# Patient Record
Sex: FEMALE | Race: WHITE | NOT HISPANIC OR LATINO | Employment: OTHER | URBAN - METROPOLITAN AREA
[De-identification: names, ages, dates, MRNs, and addresses within clinical notes are randomized per-mention and may not be internally consistent; named-entity substitution may affect disease eponyms.]

---

## 2018-02-05 ENCOUNTER — HOSPITAL ENCOUNTER (OUTPATIENT)
Dept: MAMMOGRAPHY | Facility: CLINIC | Age: 73
Discharge: HOME | End: 2018-02-05
Attending: FAMILY MEDICINE | Admitting: FAMILY MEDICINE
Payer: MEDICARE

## 2018-02-05 DIAGNOSIS — Z12.31 VISIT FOR SCREENING MAMMOGRAM: ICD-10-CM

## 2018-02-05 PROCEDURE — 77067 SCR MAMMO BI INCL CAD: CPT

## 2018-05-10 ENCOUNTER — THERAPY VISIT (OUTPATIENT)
Dept: PHYSICAL THERAPY | Facility: CLINIC | Age: 73
End: 2018-05-10
Payer: COMMERCIAL

## 2018-05-10 DIAGNOSIS — M54.50 ACUTE MIDLINE LOW BACK PAIN WITHOUT SCIATICA: Primary | ICD-10-CM

## 2018-05-10 PROCEDURE — 97110 THERAPEUTIC EXERCISES: CPT | Mod: GP | Performed by: PHYSICAL THERAPIST

## 2018-05-10 PROCEDURE — 97161 PT EVAL LOW COMPLEX 20 MIN: CPT | Mod: GP | Performed by: PHYSICAL THERAPIST

## 2018-05-10 NOTE — LETTER
[facility] ATHLETIC [facility] PHYSICAL THERAPY  [address] [place] [address]  [facility] [id number] [phone]    May 11, 2018    Re: Ml Aguirre   :   1945  MRN:  [id number]   REFERRING PHYSICIAN:   Liban Robledo    [facility] ATHLETIC [facility] PHYSICAL [facility]    Date of Initial Evaluation:  5/10/2018  Visits:  Rxs Used: 1  Reason for Referral:  Acute midline low back pain without sciatica    EVALUATION SUMMARY    [facility] Initial Evaluation  Subjective:  Ml Aguirre is a 73 year old female with a lumbar condition.  Condition occurred with:  Insidious onset.  Condition occurred: for unknown reasons.  This is a new condition  Onset of central LBP 18 for unknown reasons. She does recall that one week earlier she tripped and fell landing on her hands and knees but did not have any pain. Also she was on vacation the week before and was doing a lot more sitting than usual. Pain with bending, rise from sit, and sitting in soft surface. She had to stop sitting in her recliner d/t pain. Sitting in a firmer chair now. Her sx's are starting to improve. Pt states she has had episodes of LBP with bending activities--painful for several days then resolves. Hx of B LENY in  and .  Patient reports pain:  Central lumbar spine.  Pain is described as aching and is intermittent and reported as 2/10.   Worse during: varies.  Symptoms are exacerbated by bending, sitting and other (rise from sit) and relieved by rest.  Since onset symptoms are gradually improving.  Special tests:  X-ray (grade I retrolisthesis of L3 on L4 and mild degenerative changes).  General health as reported by patient is good.  Barriers include:  None as reported by the patient.  Red flags:  None as reported by the patient.  Pertinent medical history includes:  Cancer.  Other surgeries include:  Cancer surgery and orthopedic surgery (Hips).  Current occupation is  Retired.  Oswestry Score: 8 %      Objective:  System  Lumbar/SI Evaluation  ROM:    AROM Lumbar:   Flexion:          Hands to ankles  Ext:                    75%   Side Bend:        Left:  WNL    Right:  WNL  Rotation:           Left:     Right:   Side Glide:        Left:     Right:   Lumbar Myotomes:  not assessed  Lumbar DTR's:  not assessed  Lumbar Dermtomes:  not assessed  Neural Tension/Mobility:    Left side:SLR; SLR w/DF or Slump  negative.   Right side:   SLR w/DF; Slump or SLR  negative.   Martin Lumbar Evaluation  Posture:  Sitting: fair  Standing: good  Lordosis: WNL  Lateral Shift: no  Movement Loss:  Flexion (Flex): nil  Extension (EXT): min  Side Glide R (SG R): nil  Side Glide L (SG L): nil  Test Movements:  EIS: During: no effect    Repeat EIS: During: no effect  After: no effect  Mechanical Response: IncROM  URMILA: During: no effect    Repeat URMILA: During: no effect  After: no effect  Mechanical Response: no effect  EIL: During: no effect    Repeat EIL: During: no effect  After: no effect  Mechanical Response: IncROM  Conclusion: derangement  Principle of Treatment:  Extension: REIL x 10 5x/day    ROS  Assessment/Plan:    Patient is a 73 year old female with lumbar complaints that are improving.  Did not elicit her LBP today. She has mild loss of lumbar extension motion that improved with REIL. We worked on neutral spine with sitting and bending, instructed/practiced with lumbar roll. Pt may consider purchasing a roll.    Patient has the following significant findings with corresponding treatment plan.                Diagnosis 1:  Central LBP  Pain -  directional preference exercise  Decreased ROM/flexibility - manual therapy and therapeutic exercise  Decreased function - therapeutic activities  Impaired posture - neuro re-education    Therapy Evaluation Codes:   1) History comprised of:   Personal factors that impact the plan of care:      None.    Comorbidity factors that impact the plan of care  are:      None.     Medications impacting care: None.  2) Examination of Body Systems comprised of:   Body structures and functions that impact the plan of care:      Lumbar spine.   Activity limitations that impact the plan of care are:      Lifting, Sitting and sit to stand.  3) Clinical presentation characteristics are:   Stable/Uncomplicated.    4) Decision-Making    Low complexity using standardized patient assessment instrument and/or   measureable assessment of functional outcome.  Cumulative Therapy Evaluation is: Low complexity.    Previous and current functional limitations:  (See Goal Flow Sheet for this information)    Short term and Long term goals: (See Goal Flow Sheet for this information)     Communication ability:  Patient appears to be able to clearly communicate and understand verbal and written communication and follow directions correctly.  Treatment Explanation - The following has been discussed with the patient:   RX ordered/plan of care  Anticipated outcomes  Possible risks and side effects  This patient would benefit from PT intervention to resume normal activities.   Rehab potential is good.    Frequency:  1 X week, once daily  Duration:  for 2 weeks tapering to 2 X a month over 6 weeks  Discharge Plan:  Achieve all LTG.  Independent in home treatment program.  Reach maximal therapeutic benefit.    Thank you for your referral.    INQUIRIES  Therapist: Marzena Archuleta, PT   [facility] PHYSICAL THERAPY  [address] [place] [id number]  Phone: [phone]  Fax: [phone]

## 2018-05-10 NOTE — PROGRESS NOTES
[facility] Initial Evaluation  Subjective:  Patient is a 73 year old female presenting with rehab back hpi. The history is provided by the patient.   Ml Aguirre is a 73 year old female with a lumbar condition.  Condition occurred with:  Insidious onset.  Condition occurred: for unknown reasons.  This is a new condition  Onset of central LBP 4-1-18 for unknown reasons. She does recall that one week earlier she tripped and fell landing on her hands and knees but did not have any pain. Also she was on vacation the week before and was doing a lot more sitting than usual. Pain with bending, rise from sit, and sitting in soft surface. She had to stop sitting in her recliner d/t pain. Sitting in a firmer chair now. Her sx's are starting to improve. Pt states she has had episodes of LBP with bending activities--painful for several days then resolves. Hx of B LENY in 2012 and 2013.    Patient reports pain:  Central lumbar spine.    Pain is described as aching and is intermittent and reported as 2/10.   Worse during: varies.  Symptoms are exacerbated by bending, sitting and other (rise from sit) and relieved by rest.  Since onset symptoms are gradually improving.  Special tests:  X-ray (grade I retrolisthesis of L3 on L4 and mild degenerative changes).      General health as reported by patient is good.                  Barriers include:  None as reported by the patient.    Red flags:  None as reported by the patient.                        Objective:  System         Lumbar/SI Evaluation  ROM:    AROM Lumbar:   Flexion:          Hands to ankles  Ext:                    75%   Side Bend:        Left:  WNL    Right:  WNL  Rotation:           Left:     Right:   Side Glide:        Left:     Right:           Lumbar Myotomes:  not assessed            Lumbar DTR's:  not assessed        Lumbar Dermtomes:  not assessed                Neural Tension/Mobility:      Left side:SLR; SLR w/DF or Slump  negative.      Right side:   SLR w/DF; Slump or SLR  negative.                                                          Martin Lumbar Evaluation    Posture:  Sitting: fair  Standing: good  Lordosis: WNL  Lateral Shift: no      Movement Loss:  Flexion (Flex): nil  Extension (EXT): min  Side Glide R (SG R): nil  Side Glide L (SG L): nil  Test Movements:    EIS: During: no effect    Repeat EIS: During: no effect  After: no effect  Mechanical Response: IncROM  URMILA: During: no effect    Repeat URMILA: During: no effect  After: no effect  Mechanical Response: no effect  EIL: During: no effect    Repeat EIL: During: no effect  After: no effect  Mechanical Response: IncROM        Conclusion: derangement  Principle of Treatment:      Extension: REIL x 10 5x/day                                           ROS    Assessment/Plan:    Patient is a 73 year old female with lumbar complaints that are improving.  Did not elicit her LBP today. She has mild loss of lumbar extension motion that improved with REIL. We worked on neutral spine with sitting and bending, instructed/practiced with lumbar roll. Pt may consider purchasing a roll.    Patient has the following significant findings with corresponding treatment plan.                Diagnosis 1:  Central LBP  Pain -  directional preference exercise  Decreased ROM/flexibility - manual therapy and therapeutic exercise  Decreased function - therapeutic activities  Impaired posture - neuro re-education    Therapy Evaluation Codes:   1) History comprised of:   Personal factors that impact the plan of care:      None.    Comorbidity factors that impact the plan of care are:      None.     Medications impacting care: None.  2) Examination of Body Systems comprised of:   Body structures and functions that impact the plan of care:      Lumbar spine.   Activity limitations that impact the plan of care are:      Lifting, Sitting and sit to stand.  3) Clinical presentation characteristics  are:   Stable/Uncomplicated.  4) Decision-Making    Low complexity using standardized patient assessment instrument and/or measureable assessment of functional outcome.  Cumulative Therapy Evaluation is: Low complexity.    Previous and current functional limitations:  (See Goal Flow Sheet for this information)    Short term and Long term goals: (See Goal Flow Sheet for this information)     Communication ability:  Patient appears to be able to clearly communicate and understand verbal and written communication and follow directions correctly.  Treatment Explanation - The following has been discussed with the patient:   RX ordered/plan of care  Anticipated outcomes  Possible risks and side effects  This patient would benefit from PT intervention to resume normal activities.   Rehab potential is good.    Frequency:  1 X week, once daily  Duration:  for 2 weeks tapering to 2 X a month over 6 weeks  Discharge Plan:  Achieve all LTG.  Independent in home treatment program.  Reach maximal therapeutic benefit.    Please refer to the daily flowsheet for treatment today, total treatment time and time spent performing 1:1 timed codes.

## 2018-05-10 NOTE — MR AVS SNAPSHOT
After Visit Summary   5/10/2018    Ml Aguirre    MRN: [id number]           Patient Information     Date Of Birth          1945        Visit Information        Provider Department      5/10/2018 11:30 AM Marzena Archuleta PT [facility] Athletic [facility] Physical Therapy        Today's Diagnoses     Acute midline low back pain without sciatica    -  1       Follow-ups after your visit        Your next 10 appointments already scheduled     May 17, 2018 11:30 AM CDT   NAOMI Spine with Marzena Archuleta PT   [facility] Athletic [facility] Physical Therapy ([facility]  )    [address] [place] [address]  [facility] [id number] [phone]            May 21, 2018 11:30 AM CDT   NAOMI Spine with Meg Montoya PT   [facility] Athletic [facility] Physical Therapy ([facility]  )    [address] [place] [address]  [facility] [id number] [phone]              Who to contact     If you have questions or need follow up information about today's clinic visit or your schedule please contact [facility] ATHLETIC [facility] PHYSICAL THERAPY directly at [phone].  Normal or non-critical lab and imaging results will be communicated to you by MyChart, letter or phone within 4 business days after the clinic has received the results. If you do not hear from us within 7 days, please contact the clinic through Spiralcathart or phone. If you have a critical or abnormal lab result, we will notify you by phone as soon as possible.  Submit refill requests through Phreesia or call your pharmacy and they will forward the refill request to us. Please allow 3 business days for your refill to be completed.          Additional Information About Your Visit        MyChart Information     Phreesia lets you send messages to your doctor, view your test results, renew your prescriptions, schedule appointments and more. To sign up, go to [email or web address]  [email or web address] Click on \"Log in\" on the left side of the screen, which will take you to the Welcome page. Then click on \"Sign up Now\" on the right side of the page.     You will be asked to enter the access code listed below, as well as some personal information. Please follow the directions to create your username and password.     Your access code is: [email or web address]  Expires: 2018 12:31 PM     Your access code will  in 90 days. If you need help or a new code, please call your [place] clinic or [phone].        Care EveryWhere ID     This is your Care EveryWhere ID. This could be used by other organizations to access your [place] medical records  [id number]         Blood Pressure from Last 3 Encounters:   13 128/77   12 115/71    Weight from Last 3 Encounters:   13 62.1 kg (137 lb)   12 59.3 kg (130 lb 12.8 oz)              We Performed the Following     HC PT EVAL, LOW COMPLEXITY     NAOMI INITIAL EVAL REPORT     THERAPEUTIC EXERCISES        Primary Care Provider Office Phone # Fax #    Liban Robledo [phone]       [facility] [address]  [facility] [address]        Equal Access to Services     FLORENCIA RANGEL : Hadii aad ku hadasho Soomaali, waaxda luqadaha, qaybta kaalmada adeegyada, waxay raynain hayaan kamilah araiza . So [facility] [phone].    ATENCIÓN: Si habla español, tiene a brooks disposición servicios gratuitos de asistencia lingüística. Llame al [phone].    We comply with applicable federal civil rights laws and [place] laws. We do not discriminate on the basis of race, color, national origin, age, disability, sex, sexual orientation, or gender identity.            Thank you!     Thank you for choosing [facility] PHYSICAL THERAPY  for your care. Our goal is always to provide you with excellent care. Hearing back from our patients is one way we can continue to improve our services. Please take a " few minutes to complete the written survey that you may receive in the mail after your visit with us. Thank you!             Your Updated Medication List - Protect others around you: Learn how to safely use, store and throw away your medicines at www.disposemymeds.org.          This list is accurate as of 5/10/18 12:31 PM.  Always use your most recent med list.                   Brand Name Dispense Instructions for use Diagnosis    ACETAMINOPHEN 8 HOUR 650 MG 8 hour tablet   Generic drug:  acetaminophen     250 tablet    Take 650 mg by mouth every 6 hours as needed.    Arthritis pain, hip, S/P total hip arthroplasty       calcium + D 600-200 MG-UNIT Tabs   Generic drug:  calcium carbonate-vitamin D      Take 1 tablet by mouth 2 times daily.        HYDROmorphone 2 MG tablet    DILAUDID    120 tablet    Take 1-2 tablets by mouth every 3 hours as needed.    Arthritis pain, hip       Multi-vitamin Tabs tablet   Generic drug:  multivitamin, therapeutic with minerals      Take 1 tablet by mouth daily.        warfarin 2.5 MG tablet    COUMADIN    40 tablet    Take 1 tablet by mouth daily.    Arthritis pain, hip, S/P total hip arthroplasty

## 2018-05-11 NOTE — PROGRESS NOTES
[facility] Initial Evaluation  Subjective:  Patient is a 73 year old female presenting with rehab left ankle/foot hpi.                                      Pertinent medical history includes:  Cancer.    Other surgeries include:  Cancer surgery and orthopedic surgery (Hips).    Current occupation is Retired.                  Oswestry Score: 8 %                 Objective:  System    Physical Exam    General     ROS    Assessment/Plan:

## 2018-05-17 ENCOUNTER — THERAPY VISIT (OUTPATIENT)
Dept: PHYSICAL THERAPY | Facility: CLINIC | Age: 73
End: 2018-05-17
Payer: COMMERCIAL

## 2018-05-17 DIAGNOSIS — M54.50 ACUTE MIDLINE LOW BACK PAIN WITHOUT SCIATICA: ICD-10-CM

## 2018-05-17 PROCEDURE — 97110 THERAPEUTIC EXERCISES: CPT | Mod: GP | Performed by: PHYSICAL THERAPIST

## 2018-05-17 PROCEDURE — 97530 THERAPEUTIC ACTIVITIES: CPT | Mod: GP | Performed by: PHYSICAL THERAPIST

## 2018-05-21 ENCOUNTER — THERAPY VISIT (OUTPATIENT)
Dept: PHYSICAL THERAPY | Facility: CLINIC | Age: 73
End: 2018-05-21
Payer: COMMERCIAL

## 2018-05-21 DIAGNOSIS — M54.50 ACUTE MIDLINE LOW BACK PAIN WITHOUT SCIATICA: ICD-10-CM

## 2018-05-21 PROCEDURE — 97112 NEUROMUSCULAR REEDUCATION: CPT | Mod: GP | Performed by: PHYSICAL THERAPIST

## 2018-05-21 PROCEDURE — 97110 THERAPEUTIC EXERCISES: CPT | Mod: GP | Performed by: PHYSICAL THERAPIST

## 2018-05-21 NOTE — MR AVS SNAPSHOT
"              After Visit Summary   2018    Ml Aguirre    MRN: [id number]           Patient Information     Date Of Birth          1945        Visit Information        Provider Department      2018 11:30 AM Meg Montoya PT [facility] Athletic [facility] Physical Therapy        Today's Diagnoses     Acute midline low back pain without sciatica           Follow-ups after your visit        Who to contact     If you have questions or need follow up information about today's clinic visit or your schedule please contact [facility] ATHLETIC [facility] PHYSICAL [facility] directly at [phone].  Normal or non-critical lab and imaging results will be communicated to you by MyChart, letter or phone within 4 business days after the clinic has received the results. If you do not hear from us within 7 days, please contact the clinic through Rodatihart or phone. If you have a critical or abnormal lab result, we will notify you by phone as soon as possible.  Submit refill requests through Topple Track or call your pharmacy and they will forward the refill request to us. Please allow 3 business days for your refill to be completed.          Additional Information About Your Visit        MyChart Information     Topple Track lets you send messages to your doctor, view your test results, renew your prescriptions, schedule appointments and more. To sign up, go to [email or web address] . Click on \"Log in\" on the left side of the screen, which will take you to the Welcome page. Then click on \"Sign up Now\" on the right side of the page.     You will be asked to enter the access code listed below, as well as some personal information. Please follow the directions to create your username and password.     Your access code is: [email or web address]  Expires: 2018 12:31 PM     Your access code will  in 90 days. If you need help or a new code, please call your [place] clinic or [phone].   "      Care EveryWhere ID     This is your Care EveryWhere ID. This could be used by other organizations to access your [place] medical records  [id number]         Blood Pressure from Last 3 Encounters:   03/06/13 128/77   08/11/12 115/71    Weight from Last 3 Encounters:   03/04/13 62.1 kg (137 lb)   08/08/12 59.3 kg (130 lb 12.8 oz)              We Performed the Following     Neuromuscular Re-Education     Therapeutic Exercises        Primary Care Provider Office Phone # Fax #    Liban Robledo [phone]       [facility] [address]  [place] [address]        Equal Access to Services     FLORENCIA RANGEL : Hadii arlene gannon hadmoiseso Soqueta, waaxda luqadaha, qaybta kaalmada adeluciayada, theodore araiza . So [facility] [phone].    ATENCIÓN: Si habla español, tiene a brooks disposición servicios gratuitos de asistencia lingüística. Llame al [phone].    We comply with applicable federal civil rights laws and [place] laws. We do not discriminate on the basis of race, color, national origin, age, disability, sex, sexual orientation, or gender identity.            Thank you!     Thank you for choosing [facility] PHYSICAL THERAPY  for your care. Our goal is always to provide you with excellent care. Hearing back from our patients is one way we can continue to improve our services. Please take a few minutes to complete the written survey that you may receive in the mail after your visit with us. Thank you!             Your Updated Medication List - Protect others around you: Learn how to safely use, store and throw away your medicines at www.disposemymeds.org.          This list is accurate as of 5/21/18  4:15 PM.  Always use your most recent med list.                   Brand Name Dispense Instructions for use Diagnosis    ACETAMINOPHEN 8 HOUR 650 MG 8 hour tablet   Generic drug:  acetaminophen     250 tablet    Take 650 mg  by mouth every 6 hours as needed.    Arthritis pain, hip, S/P total hip arthroplasty       calcium + D 600-200 MG-UNIT Tabs   Generic drug:  calcium carbonate-vitamin D      Take 1 tablet by mouth 2 times daily.        HYDROmorphone 2 MG tablet    DILAUDID    120 tablet    Take 1-2 tablets by mouth every 3 hours as needed.    Arthritis pain, hip       Multi-vitamin Tabs tablet   Generic drug:  multivitamin, therapeutic with minerals      Take 1 tablet by mouth daily.        warfarin 2.5 MG tablet    COUMADIN    40 tablet    Take 1 tablet by mouth daily.    Arthritis pain, hip, S/P total hip arthroplasty

## 2019-02-05 PROBLEM — M54.50 ACUTE MIDLINE LOW BACK PAIN WITHOUT SCIATICA: Status: RESOLVED | Noted: 2018-05-10 | Resolved: 2019-02-05

## 2019-02-05 NOTE — PROGRESS NOTES
DISCHARGE REPORT    Progress reporting period is from 5-10-18 to 5-21-18.       SUBJECTIVE  Subjective changes noted by patient: Pt reports continued reduction of low back pain. Aware of stiffness in the AM upon awakening but lessens as the day progresses.    Current pain level is 0-1/10    Previous pain level was  0-210    Changes in function:  Yes (See Goal flowsheet attached for changes in current functional level)  Adverse reaction to treatment or activity: None    OBJECTIVE  Objective: FIS: mild loss d/t hamstring tightness. EIS: no loss. EIL: mild loss     ASSESSMENT/PLAN  Updated problem list and treatment plan: low back pain  STG/LTGs have been met or progress has been made towards goals:  Yes (See Goal flow sheet completed today.)  Assessment of Progress: Patient is meeting short term goals and is progressing towards long term goals.  Self Management Plans:  Patient is independent in a home treatment program.  Ml continues to require the following intervention to meet STG and LTG's:  PT intervention is no longer required to meet STG/LTG.    Recommendations:  This patient is ready to be discharged from therapy and continue their home treatment program.    Please refer to the daily flowsheet for treatment today, total treatment time and time spent performing 1:1 timed codes.

## 2019-07-02 ENCOUNTER — HOSPITAL ENCOUNTER (OUTPATIENT)
Dept: MAMMOGRAPHY | Facility: CLINIC | Age: 74
Discharge: HOME | End: 2019-07-02
Attending: FAMILY MEDICINE | Admitting: FAMILY MEDICINE
Payer: COMMERCIAL

## 2019-07-02 DIAGNOSIS — Z12.31 VISIT FOR SCREENING MAMMOGRAM: ICD-10-CM

## 2019-07-02 PROCEDURE — 77063 BREAST TOMOSYNTHESIS BI: CPT

## 2020-09-15 ENCOUNTER — HOSPITAL ENCOUNTER (OUTPATIENT)
Dept: MAMMOGRAPHY | Facility: CLINIC | Age: 75
Discharge: HOME | End: 2020-09-15
Attending: FAMILY MEDICINE | Admitting: FAMILY MEDICINE
Payer: COMMERCIAL

## 2020-09-15 DIAGNOSIS — Z12.31 VISIT FOR SCREENING MAMMOGRAM: ICD-10-CM

## 2020-09-15 PROCEDURE — 77067 SCR MAMMO BI INCL CAD: CPT

## 2021-09-17 ENCOUNTER — HOSPITAL ENCOUNTER (OUTPATIENT)
Dept: MAMMOGRAPHY | Facility: CLINIC | Age: 76
Discharge: HOME | End: 2021-09-17
Attending: FAMILY MEDICINE | Admitting: FAMILY MEDICINE
Payer: COMMERCIAL

## 2021-09-17 DIAGNOSIS — Z12.31 VISIT FOR SCREENING MAMMOGRAM: ICD-10-CM

## 2021-09-17 PROCEDURE — 77063 BREAST TOMOSYNTHESIS BI: CPT

## 2021-09-27 ENCOUNTER — MEDICAL CORRESPONDENCE (OUTPATIENT)
Dept: HEALTH INFORMATION MANAGEMENT | Facility: CLINIC | Age: 76
End: 2021-09-27

## 2021-10-08 ENCOUNTER — HOSPITAL ENCOUNTER (OUTPATIENT)
Dept: CARDIOLOGY | Facility: CLINIC | Age: 76
Discharge: HOME | End: 2021-10-08
Attending: FAMILY MEDICINE | Admitting: FAMILY MEDICINE
Payer: COMMERCIAL

## 2021-10-08 DIAGNOSIS — R07.9 CHEST PAIN, UNSPECIFIED TYPE: ICD-10-CM

## 2021-10-08 DIAGNOSIS — I10 HYPERTENSION, UNSPECIFIED TYPE: ICD-10-CM

## 2021-10-08 PROCEDURE — 93016 CV STRESS TEST SUPVJ ONLY: CPT | Performed by: INTERNAL MEDICINE

## 2021-10-08 PROCEDURE — 93018 CV STRESS TEST I&R ONLY: CPT | Performed by: INTERNAL MEDICINE

## 2021-10-08 PROCEDURE — 93325 DOPPLER ECHO COLOR FLOW MAPG: CPT | Mod: TC

## 2021-10-08 PROCEDURE — 93325 DOPPLER ECHO COLOR FLOW MAPG: CPT | Mod: 26 | Performed by: INTERNAL MEDICINE

## 2021-10-08 PROCEDURE — 93350 STRESS TTE ONLY: CPT | Mod: TC

## 2021-10-08 PROCEDURE — 93350 STRESS TTE ONLY: CPT | Mod: 26 | Performed by: INTERNAL MEDICINE

## 2021-10-08 PROCEDURE — 93321 DOPPLER ECHO F-UP/LMTD STD: CPT | Mod: 26 | Performed by: INTERNAL MEDICINE

## 2022-09-29 ENCOUNTER — HOSPITAL ENCOUNTER (OUTPATIENT)
Dept: MAMMOGRAPHY | Facility: CLINIC | Age: 77
Discharge: HOME | End: 2022-09-29
Attending: FAMILY MEDICINE | Admitting: FAMILY MEDICINE
Payer: COMMERCIAL

## 2022-09-29 DIAGNOSIS — Z12.31 VISIT FOR SCREENING MAMMOGRAM: ICD-10-CM

## 2022-09-29 PROCEDURE — 77067 SCR MAMMO BI INCL CAD: CPT

## 2023-02-07 ENCOUNTER — HOSPITAL ENCOUNTER (EMERGENCY)
Facility: CLINIC | Age: 78
Discharge: HOME | End: 2023-02-07
Attending: EMERGENCY MEDICINE | Admitting: EMERGENCY MEDICINE
Payer: COMMERCIAL

## 2023-02-07 ENCOUNTER — APPOINTMENT (OUTPATIENT)
Dept: CT IMAGING | Facility: CLINIC | Age: 78
End: 2023-02-07
Attending: EMERGENCY MEDICINE
Payer: COMMERCIAL

## 2023-02-07 VITALS
SYSTOLIC BLOOD PRESSURE: 149 MMHG | TEMPERATURE: 98.4 F | DIASTOLIC BLOOD PRESSURE: 78 MMHG | OXYGEN SATURATION: 99 % | HEART RATE: 85 BPM | RESPIRATION RATE: 16 BRPM

## 2023-02-07 DIAGNOSIS — S09.90XA CLOSED HEAD INJURY, INITIAL ENCOUNTER: ICD-10-CM

## 2023-02-07 DIAGNOSIS — S01.01XA LACERATION OF SCALP, INITIAL ENCOUNTER: ICD-10-CM

## 2023-02-07 PROCEDURE — 250N000011 HC RX IP 250 OP 636: Performed by: EMERGENCY MEDICINE

## 2023-02-07 PROCEDURE — 90714 TD VACC NO PRESV 7 YRS+ IM: CPT | Performed by: EMERGENCY MEDICINE

## 2023-02-07 PROCEDURE — 90471 IMMUNIZATION ADMIN: CPT | Performed by: EMERGENCY MEDICINE

## 2023-02-07 PROCEDURE — 12001 RPR S/N/AX/GEN/TRNK 2.5CM/<: CPT

## 2023-02-07 PROCEDURE — 70450 CT HEAD/BRAIN W/O DYE: CPT

## 2023-02-07 PROCEDURE — 99284 EMERGENCY DEPT VISIT MOD MDM: CPT | Mod: 25

## 2023-02-07 RX ADMIN — CLOSTRIDIUM TETANI TOXOID ANTIGEN (FORMALDEHYDE INACTIVATED) AND CORYNEBACTERIUM DIPHTHERIAE TOXOID ANTIGEN (FORMALDEHYDE INACTIVATED) 0.5 ML: 5; 2 INJECTION, SUSPENSION INTRAMUSCULAR at 22:31

## 2023-02-08 NOTE — ED TRIAGE NOTES
Pt was bringing in her trash can and slipped and fell back on ice. Pt hit the back of her head with no LOC. Pt denies asa or blood thinners.     Triage Assessment     Row Name 02/07/23 2024       Triage Assessment (Adult)    Airway WDL WDL       Respiratory WDL    Respiratory WDL WDL       Skin Circulation/Temperature WDL    Skin Circulation/Temperature WDL X  small laceration to the back of the head from fall       Cardiac WDL    Cardiac WDL WDL       Peripheral/Neurovascular WDL    Peripheral Neurovascular WDL WDL       Cognitive/Neuro/Behavioral WDL    Cognitive/Neuro/Behavioral WDL WDL

## 2023-02-08 NOTE — DISCHARGE INSTRUCTIONS
It is okay to shower just let the water run over your cut do not put your cut directly under the showerhead.  You may have a little bit of oozing from your cut as there are some scrapes skin around it.  It is unlikely but signs of infection which include increasing pain, redness, swelling or pus coming from the wound.  It is okay to put some antibiotic ointment on the wound to help it heal.  You may find mild headache, mild nausea, mild difficulty concentrating this can be signs of mild concussion.  It is okay to take Tylenol for mild aches and pains.  You should not put your cut underwater for any reason this includes pools, eggs, rivers, streams, hot tubs bathtubs until your staples removed.

## 2023-02-08 NOTE — ED PROVIDER NOTES
History     Chief Complaint:  Head Injury       The history is provided by the patient.      Ml Aguirre is a 77 year old female with a history of hypertension, hyperlipidemia, and malignant neoplasm, who presents with a head wound. The patient states that she went out into the street to get her garbage can and slipped backwards on a patch of ice landing on her head and back. Now in the ED, she notes headache. Denies neck pain, back pain, or loss of consciousness. The patient states that she is on medication for blood pressure and cholesterol.    Independent Historian:   None - Patient Only    Review of External Notes: None available.      ROS:  Review of Systems   10 point ROS completed, negative except as indicated in the HPI.    Allergies:  Tramadol     Medications:    Dilaudid  Blood pressure mediation  Cholesterol medication    Past Medical History:    Arthritis  Malignant neoplasm    Past Surgical History:    Hip arthroplasty, left  Hip arthroplasty, right  Colectomy  Tubal ligation     Social History:  The patient presents to the ED with her .  PCP: Liban Robledo     Physical Exam     Patient Vitals for the past 24 hrs:   BP Temp Temp src Pulse Resp SpO2   02/07/23 2021 (!) 149/78 98.4  F (36.9  C) Temporal 85 16 99 %        Physical Exam  Constitutional: Alert, attentive, GCS 15   HENT:   Head: Small occipital scalp abrasion and punctate laceration  Neck: No midline tenderness full range of motion  Eyes: EOM are normal, anicteric, conjugate gaze  CV: distal extremities warm, well perfused  Chest: Non-labored breathing on RA  GI:  non tender. No distension. No guarding or rebound.    Neurological: Alert, attentive, moving all extremities equally.   Skin: Skin is warm and dry.        Emergency Department Course     Imaging:  Head CT w/o contrast   Final Result   IMPRESSION:   1.  No acute intracranial process.   2.  Midline posterior scalp hematoma. No fracture.            Report per  radiology    Laboratory:  Labs Ordered and Resulted from Time of ED Arrival to Time of ED Departure - No data to display     Procedures       Laceration Repair      Procedure: Laceration Repair    Indication: Laceration    Consent: Verbal    Location: Posterior scalp    Length: 0.5 cm    Preparation: Irrigation with Sterile Saline.    Anesthesia/Sedation: Bupivacaine - 0.5%      Treatment/Exploration: Wound explored, no foreign bodies found     Closure: The wound was closed with one staple.     Patient Status: The patient tolerated the procedure well: Yes. There were no complications.      Emergency Department Course & Assessments:       Interventions:  Medications   Td (tetanus & diphtheria toxoids) -  adult formulation - for ages 7 years and older (0.5 mLs Intramuscular Given 23)        Independent Interpretation (X-rays, CTs, rhythm strip):  I independently viewed head CT images and do not see any large intracranial hemorrhages.    Consultations/Discussion of Management or Tests:  None    Social Determinants of Health affecting care:   None    Assessments:   I obtained history and examined the patient as noted above.  2133 I rechecked the patient and prepped for laceration repair.  2213 I rechecked the patient and explained findings.  2230 I rechecked the patient and performed a laceration repair.    Disposition:  The patient was discharged to home.     Impression & Plan      Medical Decision Makin-year-old woman past medical history seen for hypertension, hyperlipidemia presenting for slip and fall in the ice where she did hit back of her head, she denies LOC, she has no neck pain with full range of motion.  She had a small abrasion to the back of her head as well as a punctate laceration was repaired with a single staple.  Her tetanus was updated.  CT imaging of her head was obtained due to the mechanism and this was negative.  She is not on anticoagulation (had been previously on Coumadin  remotely).  Wound care was reviewed and she was discharged home.    Diagnosis:    ICD-10-CM    1. Laceration of scalp, initial encounter  S01.01XA       2. Closed head injury, initial encounter  S09.90XA            Bo Be MD  [employer]  10:46 PM 02/07/23       Scribe Disclosure:  I, Juaquin Hernandez, am serving as a scribe at 9:47 PM on 2/7/2023 to document services personally performed by Bo Be MD, based on my observations and the provider's statements to me.   2/7/2023   Bo Be MD [place], John Forrest, MD  02/07/23 2248

## 2023-10-11 ENCOUNTER — HOSPITAL ENCOUNTER (OUTPATIENT)
Dept: MAMMOGRAPHY | Facility: CLINIC | Age: 78
Discharge: HOME | End: 2023-10-11
Attending: FAMILY MEDICINE | Admitting: FAMILY MEDICINE
Payer: COMMERCIAL

## 2023-10-11 DIAGNOSIS — Z12.31 VISIT FOR SCREENING MAMMOGRAM: ICD-10-CM

## 2023-10-11 PROCEDURE — 77067 SCR MAMMO BI INCL CAD: CPT
